# Patient Record
Sex: MALE | Race: AMERICAN INDIAN OR ALASKA NATIVE
[De-identification: names, ages, dates, MRNs, and addresses within clinical notes are randomized per-mention and may not be internally consistent; named-entity substitution may affect disease eponyms.]

---

## 2019-01-16 ENCOUNTER — HOSPITAL ENCOUNTER (EMERGENCY)
Dept: HOSPITAL 5 - ED | Age: 49
LOS: 1 days | Discharge: HOME | End: 2019-01-17
Payer: COMMERCIAL

## 2019-01-16 DIAGNOSIS — E87.1: ICD-10-CM

## 2019-01-16 DIAGNOSIS — K44.9: ICD-10-CM

## 2019-01-16 DIAGNOSIS — K21.0: Primary | ICD-10-CM

## 2019-01-16 LAB
ALBUMIN SERPL-MCNC: 4.2 G/DL (ref 3.9–5)
ALT SERPL-CCNC: 28 UNITS/L (ref 7–56)
APTT BLD: 26.5 SEC. (ref 24.2–36.6)
BASOPHILS # (AUTO): 0 K/MM3 (ref 0–0.1)
BASOPHILS NFR BLD AUTO: 0.3 % (ref 0–1.8)
BILIRUB UR QL STRIP: (no result)
BLOOD UR QL VISUAL: (no result)
BUN SERPL-MCNC: 8 MG/DL (ref 9–20)
BUN SERPL-MCNC: 8 MG/DL (ref 9–20)
BUN/CREAT SERPL: 9 %
BUN/CREAT SERPL: 9 %
CALCIUM SERPL-MCNC: 8.6 MG/DL (ref 8.4–10.2)
CALCIUM SERPL-MCNC: 8.7 MG/DL (ref 8.4–10.2)
EOSINOPHIL # BLD AUTO: 0 K/MM3 (ref 0–0.4)
EOSINOPHIL NFR BLD AUTO: 0 % (ref 0–4.3)
HCT VFR BLD CALC: 40.7 % (ref 35.5–45.6)
HEMOLYSIS INDEX: 74
HEMOLYSIS INDEX: 75
HGB BLD-MCNC: 13.9 GM/DL (ref 11.8–15.2)
INR PPP: 1.03 (ref 0.87–1.13)
LYMPHOCYTES # BLD AUTO: 2.5 K/MM3 (ref 1.2–5.4)
LYMPHOCYTES NFR BLD AUTO: 16.6 % (ref 13.4–35)
MCHC RBC AUTO-ENTMCNC: 34 % (ref 32–34)
MCV RBC AUTO: 81 FL (ref 84–94)
MONOCYTES # (AUTO): 1 K/MM3 (ref 0–0.8)
MONOCYTES % (AUTO): 7 % (ref 0–7.3)
MUCOUS THREADS #/AREA URNS HPF: (no result) /HPF
PH UR STRIP: 5 [PH] (ref 5–7)
PLATELET # BLD: 359 K/MM3 (ref 140–440)
PROT UR STRIP-MCNC: (no result) MG/DL
RBC # BLD AUTO: 5.04 M/MM3 (ref 3.65–5.03)
RBC #/AREA URNS HPF: 31 /HPF (ref 0–6)
UROBILINOGEN UR-MCNC: 2 MG/DL (ref ?–2)
WBC #/AREA URNS HPF: 7 /HPF (ref 0–6)

## 2019-01-16 PROCEDURE — 85025 COMPLETE CBC W/AUTO DIFF WBC: CPT

## 2019-01-16 PROCEDURE — 99285 EMERGENCY DEPT VISIT HI MDM: CPT

## 2019-01-16 PROCEDURE — C9113 INJ PANTOPRAZOLE SODIUM, VIA: HCPCS

## 2019-01-16 PROCEDURE — 93010 ELECTROCARDIOGRAM REPORT: CPT

## 2019-01-16 PROCEDURE — 84484 ASSAY OF TROPONIN QUANT: CPT

## 2019-01-16 PROCEDURE — 85730 THROMBOPLASTIN TIME PARTIAL: CPT

## 2019-01-16 PROCEDURE — 93005 ELECTROCARDIOGRAM TRACING: CPT

## 2019-01-16 PROCEDURE — 96375 TX/PRO/DX INJ NEW DRUG ADDON: CPT

## 2019-01-16 PROCEDURE — 36415 COLL VENOUS BLD VENIPUNCTURE: CPT

## 2019-01-16 PROCEDURE — 74177 CT ABD & PELVIS W/CONTRAST: CPT

## 2019-01-16 PROCEDURE — 85610 PROTHROMBIN TIME: CPT

## 2019-01-16 PROCEDURE — 80053 COMPREHEN METABOLIC PANEL: CPT

## 2019-01-16 PROCEDURE — 83690 ASSAY OF LIPASE: CPT

## 2019-01-16 PROCEDURE — 81001 URINALYSIS AUTO W/SCOPE: CPT

## 2019-01-16 PROCEDURE — 80048 BASIC METABOLIC PNL TOTAL CA: CPT

## 2019-01-16 PROCEDURE — 96365 THER/PROPH/DIAG IV INF INIT: CPT

## 2019-01-16 PROCEDURE — 71046 X-RAY EXAM CHEST 2 VIEWS: CPT

## 2019-01-16 NOTE — EMERGENCY DEPARTMENT REPORT
ED Abdominal Pain HPI





- General


Chief Complaint: Chest Pain


Stated Complaint: RECENT SURGERY/SOB


Time Seen by Provider: 19 19:44


Source: patient


Mode of arrival: Ambulatory


Limitations: No Limitations





- History of Present Illness


Initial Comments: 





Patient is 49 years old male with no significant past medical history.  Patient 

had surgery today for his right fifth finger.  Patient presented to the ER 

complaining of epigastric pain starting a few hours back associated with some 

burping.  Patient denied any fever or chills.  He also denied any nausea or 

vomiting.


MD Complaint: abdominal pain


-: This morning


Location: epigastric


Radiation: none


Migration to: no migration


Severity scale (0 -10): 3


Quality: sharp


Associated Symptoms: denies other symptoms





- Related Data


                                    Allergies











Allergy/AdvReac Type Severity Reaction Status Date / Time


 


No Known Allergies Allergy   Unverified 19 18:53














ED Review of Systems


ROS: 


Stated complaint: RECENT SURGERY/SOB


Other details as noted in HPI





Comment: All other systems reviewed and negative


Constitutional: denies: chills, fever


Respiratory: denies: cough, orthopnea, shortness of breath


Cardiovascular: denies: chest pain, palpitations, dyspnea on exertion


Gastrointestinal: abdominal pain.  denies: nausea, vomiting, diarrhea, 

constipation, hematemesis, melena, hematochezia


Neurological: denies: headache, weakness, numbness, paresthesias, confusion, 

abnormal gait





ED Past Medical Hx





- Past Medical History


Previous Medical History?: No





- Surgical History


Past Surgical History?: Yes


Additional Surgical History: Surgery to finger





- Social History


Smoking Status: Never Smoker


Substance Use Type: None





ED Physical Exam





- General


Limitations: No Limitations


General appearance: alert, in no apparent distress





- Head


Head exam: Present: atraumatic, normocephalic, normal inspection





- Eye


Eye exam: Present: normal appearance





- ENT


ENT exam: Present: normal exam, normal orophraynx, mucous membranes moist





- Neck


Neck exam: Present: normal inspection, full ROM.  Absent: tenderness, 

meningismus





- Respiratory


Respiratory exam: Present: normal lung sounds bilaterally.  Absent: respiratory 

distress, wheezes, rales, rhonchi, stridor, chest wall tenderness, accessory 

muscle use, decreased breath sounds, prolonged expiratory





- Cardiovascular


Cardiovascular Exam: Present: regular rate, normal rhythm, normal heart sounds





- GI/Abdominal


GI/Abdominal exam: Present: soft, normal bowel sounds.  Absent: distended, 

tenderness, guarding, rebound, rigid, organomegaly, mass, bruit, pulsatile mass,

hernia





- Extremities Exam


Extremities exam: Present: normal inspection, full ROM, normal capillary refill.

 Absent: calf tenderness





- Back Exam


Back exam: Present: normal inspection, full ROM.  Absent: tenderness, CVA 

tenderness (R), CVA tenderness (L), muscle spasm, paraspinal tenderness, 

vertebral tenderness, rash noted





- Neurological Exam


Neurological exam: Present: alert, oriented X3, CN II-XII intact, normal gait, 

reflexes normal





- Skin


Skin exam: Present: warm, intact, normal color





ED Course


                                   Vital Signs











  19





  18:49 20:40 21:01


 


Temperature 97.5 F L 97.7 F 


 


Pulse Rate 90  86


 


Respiratory 20 25 H 27 H





Rate   


 


Blood Pressure 161/77  138/80


 


Blood Pressure  138/80 





[Left]   


 


O2 Sat by Pulse 97 98 99





Oximetry   














  19





  00:00


 


Temperature 


 


Pulse Rate 95 H


 


Respiratory 26 H





Rate 


 


Blood Pressure 135/78


 


Blood Pressure 





[Left] 


 


O2 Sat by Pulse 





Oximetry 














ED Medical Decision Making





- Lab Data


Result diagrams: 


                                 19 19:00





                                 19 23:21





- EKG Data


-: EKG Interpreted by Me


EKG shows normal: sinus rhythm


Rate: normal





- EKG Data


Interpretation: no acute changes





- Radiology Data


Radiology results: report reviewed





Referring Physician:   NORMA ANDRES


Patient Name:   MICHELLE PIERRE


Patient ID:   [id number]


YOB: 1970


Sex:   Male


Accession:   [id number]


Report Date:   2019


Report Status:   Finalized


Findings


[address] [address] 





Cat Scan Report 


Signed 





Patient: MICHELLE PIERRE MR#: [id number] 


: 1970 Acct:[id number] 


Age/Sex: 49 / M ADM Date: 19 


Loc: ED 


Attending Dr: 








Ordering Physician: NORMA ANDRES 


Date of Service: 19 


Procedure(s): CT abdomen pelvis w con 


Accession Number(s): [id number] 





cc: NORMA ANDRES 








FINAL REPORT 





PROCEDURE: CT ABDOMEN PELVIS W CON 





TECHNIQUE: Computerized axial tomography of the abdomen and pelvis was performed

 after the IV 


injection of iodinated nonionic contrast. 





HISTORY: abdominal pain 





COMPARISON: No prior studies are available for comparison. 





FINDINGS: 


Liver, spleen, pancreas and adrenal glands are within normal limits. Bilateral 

kidneys demonstrate 


uniform enhancement without hydronephrosis. Aorta is of normal caliber. There is

 no free fluid or 


free air. Gallbladder is unremarkable. Small bowel loops are within normal li

mits. Mild degree 


residual stool is noted. Appendix is unremarkable. There is evidence of a hiatal

 hernia with the 


thickened esophageal walls. Visualized thoracic esophagus is filled with fluid. 

Trace bilateral 


pleural effusions are noted. Vertebral height is normal. 





IMPRESSION: 


Hiatal hernia with fluid filled distal esophagus demonstrating thickened walls. 

Reflux esophagitis 


cannot be excluded. 





Trace bilateral pleural effusions 











Transcribed By: [facility] 


Dictated By: OLI GALINDO 


Electronically Authenticated By: OLI GALINDO 


Signed Date/Time: 19 











DD/DT: 19 


TD/TT: 19





- Medical Decision Making





Patient is 49 years old male with no significant past medical history.  Patient 

had surgery today for his right fifth finger.  Patient presented to the ER 

complaining of epigastric pain starting a few hours back associated with some 

burping.  Patient denied any fever or chills.  He also denied any nausea or 

vomiting.





Patient pain is managed to the epigastric area.  CT abdomen and pelvis show a 

hiatal hernia with reflux esophagitis.  The appendix is normal.  EKG is 

unremarkable.  Troponin is negative.  Patient stated that he is feeling much 

better after the GI cocktail.  I believe patient has symptoms is most likely 

related to the esophagitis and hiatal hernia since he is burping a lot.  I 

advised patient to follow up with his primary care physician in the next 2-3 

days.  Will start patient on ciprofloxacin, Flagyl and Nexium.


Critical care attestation.: 


If time is entered above; I have spent that time in minutes in the direct care 

of this critically ill patient, excluding procedure time.








ED Disposition


Clinical Impression: 


 Abdominal pain, Hiatal hernia with GERD and esophagitis, Hyponatremia





Disposition:  TO HOME OR SELFCARE


Is pt being admited?: No


Condition: Stable


Instructions:  Hiatal Hernia (ED), Diet for Ulcers and Gastritis (ED), 

Hyponatremia (ED)


Referrals: 


PRIMARY CARE,MD [Referring] - 3-5 Days

## 2019-01-16 NOTE — XRAY REPORT
FINAL REPORT



PROCEDURE:  XR CHEST ROUTINE one-view 



TECHNIQUE:  Chest radiograph anteroposterior view. CPT 24256







HISTORY:  SOB 



COMPARISON:  No prior studies are available for comparison.



FINDINGS:  

Heart: Normal.



Mediastinum/Vessels: Normal.



Lungs/Pleural space: No infiltrate, effusion, or pneumothorax.



Bony thorax: No acute osseous abnormality.



Life support devices: None.



IMPRESSION:  

No radiographic evidence of acute cardiopulmonary abnormality.

## 2019-01-17 VITALS — DIASTOLIC BLOOD PRESSURE: 78 MMHG | SYSTOLIC BLOOD PRESSURE: 135 MMHG

## 2019-01-17 LAB
BUN SERPL-MCNC: 8 MG/DL (ref 9–20)
BUN/CREAT SERPL: 9 %
CALCIUM SERPL-MCNC: 8.2 MG/DL (ref 8.4–10.2)
HEMOLYSIS INDEX: 7

## 2022-07-19 NOTE — CAT SCAN REPORT
FINAL REPORT



PROCEDURE:  CT ABDOMEN PELVIS W CON



TECHNIQUE:  Computerized axial tomography of the abdomen and pelvis was performed after the IV inject
ion of iodinated nonionic contrast. 



HISTORY:  abdominal pain 



COMPARISON:  No prior studies are available for comparison.



FINDINGS:  

Liver, spleen, pancreas and adrenal glands are within normal limits. Bilateral kidneys demonstrate un
iform enhancement without hydronephrosis. Aorta is of normal caliber. There is no free fluid or free 
air. Gallbladder is unremarkable. Small bowel loops are within normal limits. Mild degree residual st
ool is noted. Appendix is unremarkable. There is evidence of a hiatal hernia with the thickened esoph
ageal walls. Visualized thoracic esophagus is filled with fluid. Trace bilateral pleural effusions ar
e noted. Vertebral height is normal. 



IMPRESSION:  

Hiatal hernia with fluid filled distal esophagus demonstrating thickened walls. Reflux esophagitis ca
nnot be excluded.



Trace bilateral pleural effusions
fall precautions